# Patient Record
Sex: FEMALE | URBAN - METROPOLITAN AREA
[De-identification: names, ages, dates, MRNs, and addresses within clinical notes are randomized per-mention and may not be internally consistent; named-entity substitution may affect disease eponyms.]

---

## 2022-01-11 ENCOUNTER — OFFICE VISIT (OUTPATIENT)
Dept: URBAN - METROPOLITAN AREA CLINIC 6 | Facility: CLINIC | Age: 77
End: 2022-01-11
Payer: MEDICARE

## 2022-01-11 DIAGNOSIS — H04.123 DRY EYE SYNDROME OF BILATERAL LACRIMAL GLANDS: ICD-10-CM

## 2022-01-11 DIAGNOSIS — H40.1122 PRIMARY OPEN-ANGLE GLAUCOMA, MODERATE STAGE, LEFT EYE: Primary | ICD-10-CM

## 2022-01-11 PROCEDURE — 92012 INTRM OPH EXAM EST PATIENT: CPT | Performed by: OPTOMETRIST

## 2022-01-11 PROCEDURE — 92133 CPTRZD OPH DX IMG PST SGM ON: CPT | Performed by: OPTOMETRIST

## 2022-01-11 PROCEDURE — 92083 EXTENDED VISUAL FIELD XM: CPT | Performed by: OPTOMETRIST

## 2022-01-11 ASSESSMENT — INTRAOCULAR PRESSURE
OS: 9
OD: 9

## 2022-01-11 NOTE — IMPRESSION/PLAN
Impression: Primary open-angle glaucoma, moderate stage, left eye: H40.1122. Plan: IOP low OU with brimonidine BID OS-CCRx. OCT RNFL full OD and I thinning OS with 87/72. HVF 24-2 unreliable OU with nonspecific defects OD and superior defect OS. Discussed with the patient that lack of compliance with drops/treatment and follow up visits can result in severe vision loss or blindness.

## 2022-08-09 ENCOUNTER — OFFICE VISIT (OUTPATIENT)
Dept: URBAN - METROPOLITAN AREA CLINIC 6 | Facility: CLINIC | Age: 77
End: 2022-08-09
Payer: MEDICARE

## 2022-08-09 DIAGNOSIS — H49.22 SIXTH [ABDUCENT] NERVE PALSY, LEFT EYE: ICD-10-CM

## 2022-08-09 DIAGNOSIS — H40.1122 PRIMARY OPEN-ANGLE GLAUCOMA, MODERATE STAGE, LEFT EYE: Primary | ICD-10-CM

## 2022-08-09 DIAGNOSIS — H04.123 DRY EYE SYNDROME OF BILATERAL LACRIMAL GLANDS: ICD-10-CM

## 2022-08-09 PROCEDURE — 92014 COMPRE OPH EXAM EST PT 1/>: CPT | Performed by: OPTOMETRIST

## 2022-08-09 PROCEDURE — 92250 FUNDUS PHOTOGRAPHY W/I&R: CPT | Performed by: OPTOMETRIST

## 2022-08-09 ASSESSMENT — INTRAOCULAR PRESSURE
OD: 12
OS: 14

## 2022-08-09 NOTE — IMPRESSION/PLAN
Impression: Primary open-angle glaucoma, moderate stage, left eye: H40.1122. Plan: POAG Moderate stage OS vs Possible Ischemic optic neuropathy- IOP low OU with brimonidine BID OS-CCRx. Fundus photo today. Discussed with the patient that lack of compliance with drops/treatment and follow up visits can result in severe vision loss or blindness. No longer under the care of Dr. Don Hannah. Last visit was 2+ yrs ago.

## 2022-08-09 NOTE — IMPRESSION/PLAN
Impression: Dry eye syndrome of bilateral lacrimal glands: H04.123. Plan: Mild dry eye both eyes - Recommend use of high quality artificial tears 3-4x per day. Recommended to use gel QHS.

## 2022-08-09 NOTE — IMPRESSION/PLAN
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 Plan: Cranial nerve 6 Palsy LT (H49.22)- Pt was going to get prisms with Dr. Debra Camejo, however decided not to proceed. Compensates with head turn.

## 2022-12-08 ENCOUNTER — OFFICE VISIT (OUTPATIENT)
Dept: URBAN - METROPOLITAN AREA CLINIC 6 | Facility: CLINIC | Age: 77
End: 2022-12-08
Payer: MEDICARE

## 2022-12-08 DIAGNOSIS — H16.142 PUNCTATE KERATITIS OF LEFT EYE: Primary | ICD-10-CM

## 2022-12-08 PROCEDURE — 92012 INTRM OPH EXAM EST PATIENT: CPT | Performed by: OPTOMETRIST

## 2022-12-08 RX ORDER — NEOMYCIN SULFATE, POLYMYXIN B SULFATE AND DEXAMETHASONE 3.5; 10000; 1 MG/ML; [USP'U]/ML; MG/ML
SUSPENSION OPHTHALMIC
Qty: 5 | Refills: 0 | Status: ACTIVE
Start: 2022-12-08

## 2022-12-08 ASSESSMENT — INTRAOCULAR PRESSURE
OS: 10
OD: 10

## 2022-12-08 NOTE — IMPRESSION/PLAN
Impression: Punctate keratitis of left eye: H16.142. Plan:  Recommend use of high quality artificial tears 3-4x per day.  Start Maxitrol gtts TID OS for 5 days

## 2022-12-15 ENCOUNTER — OFFICE VISIT (OUTPATIENT)
Dept: URBAN - METROPOLITAN AREA CLINIC 6 | Facility: CLINIC | Age: 77
End: 2022-12-15
Payer: MEDICARE

## 2022-12-15 DIAGNOSIS — H16.223 KERATOCONJUNCTIVITIS SICCA, BILATERAL: Primary | ICD-10-CM

## 2022-12-15 PROCEDURE — 92012 INTRM OPH EXAM EST PATIENT: CPT | Performed by: OPTOMETRIST

## 2022-12-15 RX ORDER — CYCLOSPORINE 0.5 MG/ML
0.05 % EMULSION OPHTHALMIC
Qty: 180 | Refills: 2 | Status: ACTIVE
Start: 2022-12-15

## 2022-12-15 ASSESSMENT — INTRAOCULAR PRESSURE
OS: 18
OD: 13

## 2022-12-15 NOTE — IMPRESSION/PLAN
Impression: Keratoconjunctivitis sicca, bilateral: W28.917. Plan: Dry eyes - Recommend use of high quality artificial tears 3-4x per day prn. Begin Restasis BID OU or pursue other generic options if insurance does not cover Restasis.

## 2023-04-25 ENCOUNTER — OFFICE VISIT (OUTPATIENT)
Dept: URBAN - METROPOLITAN AREA CLINIC 6 | Facility: CLINIC | Age: 78
End: 2023-04-25
Payer: MEDICARE

## 2023-04-25 DIAGNOSIS — H40.1122 PRIMARY OPEN-ANGLE GLAUCOMA, MODERATE STAGE, LEFT EYE: Primary | ICD-10-CM

## 2023-04-25 PROCEDURE — 92133 CPTRZD OPH DX IMG PST SGM ON: CPT | Performed by: OPTOMETRIST

## 2023-04-25 PROCEDURE — 92012 INTRM OPH EXAM EST PATIENT: CPT | Performed by: OPTOMETRIST

## 2023-04-25 PROCEDURE — 92083 EXTENDED VISUAL FIELD XM: CPT | Performed by: OPTOMETRIST

## 2023-04-25 RX ORDER — BRIMONIDINE TARTRATE 1 MG/ML
0.1 % SOLUTION/ DROPS OPHTHALMIC
Qty: 5 | Refills: 6 | Status: ACTIVE
Start: 2023-04-25

## 2023-04-25 ASSESSMENT — INTRAOCULAR PRESSURE
OS: 13
OD: 10

## 2023-04-25 NOTE — IMPRESSION/PLAN
Impression: Primary open-angle glaucoma, moderate stage, left eye: H40.6806. Plan: POAG Moderate stage OS vs Possible Ischemic optic neuropathy- No longer under the care of Dr. Tahira Tomas. Last visit was 2+ yrs ago. RNFL ordered 85/65 with inf thin quadrant OS stable to last, HVF 24-2 ordered with full quadrants OD/sup arcuate OS. Pt has been off drops for a few months now as per pt. Re-start drop and check in 4-6 weeks. Pt's IOP low teens even off drop, however, pt has been on medication for years now. Pt has been using drop as Brimonidine QHS OS.